# Patient Record
Sex: FEMALE | Race: WHITE | NOT HISPANIC OR LATINO | URBAN - METROPOLITAN AREA
[De-identification: names, ages, dates, MRNs, and addresses within clinical notes are randomized per-mention and may not be internally consistent; named-entity substitution may affect disease eponyms.]

---

## 2022-10-12 ENCOUNTER — OFFICE (AMBULATORY)
Dept: URBAN - METROPOLITAN AREA CLINIC 11 | Facility: CLINIC | Age: 47
End: 2022-10-12

## 2022-10-12 VITALS
HEIGHT: 65 IN | SYSTOLIC BLOOD PRESSURE: 144 MMHG | DIASTOLIC BLOOD PRESSURE: 78 MMHG | OXYGEN SATURATION: 99 % | WEIGHT: 142 LBS | HEART RATE: 66 BPM | RESPIRATION RATE: 18 BRPM

## 2022-10-12 DIAGNOSIS — R14.2 ERUCTATION: ICD-10-CM

## 2022-10-12 DIAGNOSIS — K21.9 GASTRO-ESOPHAGEAL REFLUX DISEASE WITHOUT ESOPHAGITIS: ICD-10-CM

## 2022-10-12 DIAGNOSIS — K58.1 IRRITABLE BOWEL SYNDROME WITH CONSTIPATION: ICD-10-CM

## 2022-10-12 DIAGNOSIS — R14.0 ABDOMINAL DISTENSION (GASEOUS): ICD-10-CM

## 2022-10-12 PROCEDURE — 99204 OFFICE O/P NEW MOD 45 MIN: CPT

## 2022-10-12 RX ORDER — PANTOPRAZOLE SODIUM 40 MG/1
40 TABLET, DELAYED RELEASE ORAL
Qty: 30 | Refills: 6 | Status: ACTIVE
Start: 2022-10-12

## 2022-10-12 RX ORDER — LINACLOTIDE 145 UG/1
CAPSULE, GELATIN COATED ORAL
Qty: 30 | Refills: 11 | Status: ACTIVE
Start: 2022-10-12

## 2022-10-12 RX ORDER — SODIUM PICOSULFATE, MAGNESIUM OXIDE, AND ANHYDROUS CITRIC ACID 10; 3.5; 12 MG/160ML; G/160ML; G/160ML
LIQUID ORAL
Qty: 320 | Refills: 0 | Status: ACTIVE
Start: 2022-10-12

## 2022-10-12 NOTE — SERVICEHPINOTES
Hemanth   Is a 47-year-old white female who presents to establish GI care after referral from her gynecologist.  She reports she has had GI issues for a long time with the symptoms worse in the summer.  She reports she has been evaluated by GYN and then Urology in believes she has possible bladder prolapse and something else prolapsing but she does not recall what this is.  She reports having multiple food sensitivities and has tried probiotics and feels that they have been helpful.  she was told to start using fiber MiraLax and reports she is not been consistent with fiber, also reports she is "not down with the miralax"  so she elected to instead do a natural laxative called renew life.  She is supposed to be using a pessary as well this states that is not been working.  She experiences heartburn multiple times a week describes it as mid chest and throat burning sensation.  She experiences nausea daily with random onset and states that might be worse when constipated.  She denies any vomiting.  She does endorse dysphagia with variable occurrences, has occurred 2-3 times this past week.  Prior to that the last occurrence was about a month ago.  She endorses bloating that comes and goes, worse with p.o. intake usually does improve with bowel movement but not with passing flatulence.  She has a bowel movement every other day with variable consistency that leans more towards constipation.  She reports she has had constipation for a number of years but thought this was normal.   She will experience very small amount of bright red blood on toilet paper once or twice a month, usually more with constipation or if she has tissue irritation near the anus.  She denies any itching but believes she may occasionally have a protrusion.  She denies any unintentional weight loss.  She denies any tobacco or drug use in endorses only rare alcohol consumption.  She denies any known cardiac, pulmonary, renal, hematologic or neurologic disorders.  She is not on any blood thinners and does not recall any family history of esophageal, stomach or colorectal cancer.